# Patient Record
(demographics unavailable — no encounter records)

---
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
## 2024-12-03 NOTE — ASSESSMENT
[FreeTextEntry1] : I had a lengthy discussion with the patient today regarding his relatively stable left ring and small finger metacarpal base fractures.  I explained the nature of the injury and the overall acceptable alignment of the fractures.  I recommended full-time immobilization in an ulnar gutter cast for the next 4 to 6 weeks while keeping the hand elevated and moving the fingers within the confines of the cast to minimize worsening stiffness and swelling.  I cautioned against any loading or weightbearing with the affected hand.  I also discussed cast care precautions so specifically not get it wet.  I explained that if the fracture is healing her current position, which I anticipate, a satisfactory functional result would be anticipated with proper and diligent rehab post immobilization.  I did explain the possibility of fracture displacement especially when stressing the hand.  He will follow-up with me in 5 weeks for reevaluation, cast removal and most likely conversion to removable splint and initiation of range of motion exercises

## 2024-12-03 NOTE — PROCEDURE
[FreeTextEntry1] : A well padded and molded left ulnar gutter cast was formulated and applied to the patient today.

## 2024-12-03 NOTE — PHYSICAL EXAM
[de-identified] : Physical exam demonstrates the patient to be alert and oriented x 3 and capable of ambulation. The patient is well-developed and well-nourished in no apparent respiratory distress. The majority of the skin is intact bilaterally in the upper extremities without any bilateral elbow lymphadenopathy.  The patient's ulnar gutter splint was removed today. No skin breakdown or skin irritation around the splint edges. There is global swelling and ecchymosis over the left hand and digits. There is diminished left wrist and digital range of motion. There is tenderness to palpation over the left small and ring finger metacarpal base. Nontender over the metacarpal shaft or neck. There is no evidence of malrotation or angulation of the left-hand digits. APB and FDI are 5/5 strength on the left side. All digits are well perfused. Sensation intact to light touch throughout the entire left hand.  [de-identified] : PA, lateral and oblique X-Rays were obtained today to assess for bony injury. There is presence of minimally displaced left small and ring finger metacarpal base fractures.  Intra-articular involvement at the small finger carpal base.  Congruent ring and small finger CMC joint without appreciation of subluxation or dislocation.